# Patient Record
Sex: MALE | URBAN - METROPOLITAN AREA
[De-identification: names, ages, dates, MRNs, and addresses within clinical notes are randomized per-mention and may not be internally consistent; named-entity substitution may affect disease eponyms.]

---

## 2024-02-05 ENCOUNTER — OV CON (OUTPATIENT)
Dept: URBAN - METROPOLITAN AREA CLINIC 27 | Facility: CLINIC | Age: 54
End: 2024-02-05

## 2024-02-05 NOTE — HPI-TODAY'S VISIT:
Mr. Magdaleno is a 53 YOM new patient presenting for initial screening colonoscopy with ?Dr. Cooper.